# Patient Record
Sex: MALE | Race: WHITE | URBAN - METROPOLITAN AREA
[De-identification: names, ages, dates, MRNs, and addresses within clinical notes are randomized per-mention and may not be internally consistent; named-entity substitution may affect disease eponyms.]

---

## 2021-07-26 ENCOUNTER — OFFICE VISIT (OUTPATIENT)
Dept: URBAN - METROPOLITAN AREA CLINIC 48 | Facility: CLINIC | Age: 66
End: 2021-07-26
Payer: MEDICARE

## 2021-07-26 DIAGNOSIS — H33.302 UNSPECIFIED RETINAL BREAK, LEFT EYE: Primary | ICD-10-CM

## 2021-07-26 PROCEDURE — 92134 CPTRZ OPH DX IMG PST SGM RTA: CPT | Performed by: OPTOMETRIST

## 2021-07-26 PROCEDURE — 99214 OFFICE O/P EST MOD 30 MIN: CPT | Performed by: OPTOMETRIST

## 2021-07-26 ASSESSMENT — INTRAOCULAR PRESSURE
OD: 9
OS: 12

## 2021-07-26 NOTE — IMPRESSION/PLAN
Impression: Unspecified retinal break, left eye: H33.302.
x2 weeks Plan: Possible hole (1:00) seen on exam today. OCT scan done today. Discussed with patient. RD precautions were advised. Informed the patient to call the office immediately should any increase in flashes, change in vision, or curtian/veil should appear RTC Wednesday with Dr. Jasiel Yanez

## 2021-07-28 ENCOUNTER — POST-OPERATIVE VISIT (OUTPATIENT)
Dept: URBAN - METROPOLITAN AREA CLINIC 48 | Facility: CLINIC | Age: 66
End: 2021-07-28
Payer: MEDICARE

## 2021-07-28 ENCOUNTER — SURGERY (OUTPATIENT)
Dept: URBAN - METROPOLITAN AREA SURGERY 26 | Facility: SURGERY | Age: 66
End: 2021-07-28
Payer: MEDICARE

## 2021-07-28 DIAGNOSIS — H35.81 RETINAL EDEMA: Primary | ICD-10-CM

## 2021-07-28 DIAGNOSIS — H33.312 HORSESHOE TEAR OF RETINA WITHOUT DETACHMENT, LEFT EYE: ICD-10-CM

## 2021-07-28 PROCEDURE — 92134 CPTRZ OPH DX IMG PST SGM RTA: CPT | Performed by: OPHTHALMOLOGY

## 2021-07-28 PROCEDURE — 99024 POSTOP FOLLOW-UP VISIT: CPT | Performed by: OPTOMETRIST

## 2021-07-28 PROCEDURE — 99204 OFFICE O/P NEW MOD 45 MIN: CPT | Performed by: OPHTHALMOLOGY

## 2021-07-28 PROCEDURE — 67145 PROPH RTA DTCHMNT PC: CPT | Performed by: OPHTHALMOLOGY

## 2021-07-28 RX ORDER — KETOROLAC TROMETHAMINE 5 MG/ML
0.5 % SOLUTION OPHTHALMIC
Qty: 1 | Refills: 1 | Status: INACTIVE
Start: 2021-07-28 | End: 2021-12-08

## 2021-07-28 RX ORDER — KETOROLAC TROMETHAMINE 4.5 MG/ML
0.45 % SOLUTION/ DROPS OPHTHALMIC
Qty: 1 | Refills: 1 | Status: INACTIVE
Start: 2021-07-28 | End: 2021-07-28

## 2021-07-28 RX ORDER — PREDNISOLONE ACETATE 10 MG/ML
1 % SUSPENSION/ DROPS OPHTHALMIC
Qty: 1 | Refills: 1 | Status: INACTIVE
Start: 2021-07-28 | End: 2021-12-08

## 2021-07-28 ASSESSMENT — INTRAOCULAR PRESSURE
OS: 17
OS: 17
OD: 16
OS: 17
OD: 16
OD: 16

## 2021-07-28 NOTE — IMPRESSION/PLAN
Impression: S/P Indirect laser OS - . Encounter for surgical aftercare following surgery on a sense organ  Z48.810. Excellent post op course Plan: Subconjunctival Hemorrhage and chemosis on exam today. Discussed with the patient that this should resolve over time. 

RTC as scheduled

## 2021-07-28 NOTE — IMPRESSION/PLAN
Impression: Retinal edema: H35.81 Left. Plan: OCT ordered and performed today. Discussed diagnosis with patient. The clinical exam and OCT is consistent with Macular Edema. Discussed treatment options. . Recommends start patient on Prednisolone and Ketorolac QID OS. Rx sent to patients pharmacy. Re-Eval in 1 month. If no improvement we will consider injection at next visit.  Patient agrees with plan

## 2021-07-28 NOTE — IMPRESSION/PLAN
Impression: Horseshoe tear of retina without detachment, left eye: H33.312. Left. Plan: OCT ordered and performed today. Discussed diagnosis with patient. The clinical exam is consistent with a retinal tear. The patient was advised this could progress into a retinal detachment causing further visual loss if urgent treatment is not performed. We discussed the natural history of retinal tears and the R/B/A of the treatment options including laser retinopexy, Cryotherapy and Observation. Risk of treatment include reduced peripheral vision, pain, swelling, Intraocular hemorrhage, progressive retinal tear or detachment and the possible need for sx. After discussing the R/B/A of each treatment option. The patient elects to proceed with laser treatment to the retinal tear in the left eye.  
RL-1

## 2021-08-25 ENCOUNTER — POST-OPERATIVE VISIT (OUTPATIENT)
Dept: URBAN - METROPOLITAN AREA CLINIC 48 | Facility: CLINIC | Age: 66
End: 2021-08-25
Payer: MEDICARE

## 2021-08-25 DIAGNOSIS — Z48.810 ENCOUNTER FOR SURGICAL AFTERCARE FOLLOWING SURGERY ON A SENSE ORGAN: Primary | ICD-10-CM

## 2021-08-25 PROCEDURE — 99024 POSTOP FOLLOW-UP VISIT: CPT | Performed by: OPHTHALMOLOGY

## 2021-08-25 ASSESSMENT — INTRAOCULAR PRESSURE
OS: 20
OD: 14

## 2021-08-25 NOTE — IMPRESSION/PLAN
Impression: S/P Indirect laser OS - 28 Days. Encounter for surgical aftercare following surgery on a sense organ  Z48.810.  Plan: Continue Pred TID OS for 1 week then BID for 1 week then QD for 1 week then D/C and Ketoralac QID OS until gone then D/C

## 2021-09-22 ENCOUNTER — POST-OPERATIVE VISIT (OUTPATIENT)
Dept: URBAN - METROPOLITAN AREA CLINIC 48 | Facility: CLINIC | Age: 66
End: 2021-09-22
Payer: MEDICARE

## 2021-09-22 PROCEDURE — 99024 POSTOP FOLLOW-UP VISIT: CPT | Performed by: OPHTHALMOLOGY

## 2021-09-22 ASSESSMENT — INTRAOCULAR PRESSURE
OD: 13
OS: 23

## 2021-09-22 NOTE — IMPRESSION/PLAN
Impression: S/P Indirect laser OS - 56 Days. Encounter for surgical aftercare following surgery on a sense organ  Z48.810.  Plan:

## 2021-12-08 ENCOUNTER — OFFICE VISIT (OUTPATIENT)
Dept: URBAN - METROPOLITAN AREA CLINIC 48 | Facility: CLINIC | Age: 66
End: 2021-12-08
Payer: MEDICARE

## 2021-12-08 PROCEDURE — 99213 OFFICE O/P EST LOW 20 MIN: CPT | Performed by: OPHTHALMOLOGY

## 2021-12-08 RX ORDER — KETOROLAC TROMETHAMINE 5 MG/ML
0.5 % SOLUTION OPHTHALMIC
Qty: 1 | Refills: 2 | Status: ACTIVE
Start: 2021-12-08

## 2021-12-08 ASSESSMENT — INTRAOCULAR PRESSURE
OS: 16
OD: 13

## 2021-12-08 NOTE — IMPRESSION/PLAN
Impression: Retinal edema: H35.81. Left. Plan: OCT ordered and performed today. Discussed diagnosis with patient. The clinical exam and OCT is consistent with Cystoid Macular Edema. Discussed treatment options. Discussed with the patient to restart ketorolac gtts, sent to pharm.

## 2022-01-07 ENCOUNTER — OFFICE VISIT (OUTPATIENT)
Dept: URBAN - METROPOLITAN AREA CLINIC 48 | Facility: CLINIC | Age: 67
End: 2022-01-07
Payer: MEDICARE

## 2022-01-07 PROCEDURE — 92134 CPTRZ OPH DX IMG PST SGM RTA: CPT | Performed by: OPHTHALMOLOGY

## 2022-01-07 PROCEDURE — 99213 OFFICE O/P EST LOW 20 MIN: CPT | Performed by: OPHTHALMOLOGY

## 2022-01-07 ASSESSMENT — INTRAOCULAR PRESSURE
OS: 19
OD: 12

## 2022-01-07 NOTE — IMPRESSION/PLAN
Impression: Retinal edema: H35.81. Left. Plan: OCT ordered and performed today. Discussed diagnosis with patient. The clinical exam and OCT is consistent with Cystoid Macular Edema. Discussed treatment options. Discussed with the patient to d/c Ketoralac and reevaluate.

## 2024-07-18 ENCOUNTER — OFFICE VISIT (OUTPATIENT)
Dept: URBAN - METROPOLITAN AREA CLINIC 35 | Facility: CLINIC | Age: 69
End: 2024-07-18
Payer: MEDICARE

## 2024-07-18 DIAGNOSIS — H43.813 VITREOUS DEGENERATION, BILATERAL: ICD-10-CM

## 2024-07-18 DIAGNOSIS — H35.81 RETINAL EDEMA: Primary | ICD-10-CM

## 2024-07-18 DIAGNOSIS — H33.312 HORSESHOE TEAR OF RETINA WITHOUT DETACHMENT, LEFT EYE: ICD-10-CM

## 2024-07-18 PROCEDURE — 99204 OFFICE O/P NEW MOD 45 MIN: CPT | Performed by: OPHTHALMOLOGY

## 2024-07-18 PROCEDURE — 92201 OPSCPY EXTND RTA DRAW UNI/BI: CPT | Performed by: OPHTHALMOLOGY

## 2024-07-18 PROCEDURE — 92134 CPTRZ OPH DX IMG PST SGM RTA: CPT | Performed by: OPHTHALMOLOGY

## 2024-07-18 ASSESSMENT — INTRAOCULAR PRESSURE
OS: 15
OD: 17

## 2024-08-13 ENCOUNTER — OFFICE VISIT (OUTPATIENT)
Dept: URBAN - METROPOLITAN AREA CLINIC 49 | Facility: LOCATION | Age: 69
End: 2024-08-13
Payer: MEDICARE

## 2024-08-13 DIAGNOSIS — H33.312 HORSESHOE TEAR OF RETINA WITHOUT DETACHMENT, LEFT EYE: ICD-10-CM

## 2024-08-13 DIAGNOSIS — H43.813 VITREOUS DEGENERATION, BILATERAL: ICD-10-CM

## 2024-08-13 DIAGNOSIS — H35.81 RETINAL EDEMA: Primary | ICD-10-CM

## 2024-08-13 PROCEDURE — 99213 OFFICE O/P EST LOW 20 MIN: CPT | Performed by: OPHTHALMOLOGY

## 2024-08-13 PROCEDURE — 92134 CPTRZ OPH DX IMG PST SGM RTA: CPT | Performed by: OPHTHALMOLOGY

## 2024-08-13 ASSESSMENT — INTRAOCULAR PRESSURE
OD: 9
OS: 14